# Patient Record
Sex: FEMALE | Race: OTHER | HISPANIC OR LATINO | Employment: FULL TIME | URBAN - METROPOLITAN AREA
[De-identification: names, ages, dates, MRNs, and addresses within clinical notes are randomized per-mention and may not be internally consistent; named-entity substitution may affect disease eponyms.]

---

## 2019-10-31 ENCOUNTER — APPOINTMENT (OUTPATIENT)
Dept: RADIOLOGY | Age: 49
End: 2019-10-31
Payer: COMMERCIAL

## 2019-10-31 ENCOUNTER — OFFICE VISIT (OUTPATIENT)
Dept: URGENT CARE | Age: 49
End: 2019-10-31
Payer: COMMERCIAL

## 2019-10-31 VITALS
SYSTOLIC BLOOD PRESSURE: 122 MMHG | OXYGEN SATURATION: 100 % | HEART RATE: 69 BPM | TEMPERATURE: 97.9 F | DIASTOLIC BLOOD PRESSURE: 62 MMHG | WEIGHT: 144 LBS | BODY MASS INDEX: 24.59 KG/M2 | RESPIRATION RATE: 18 BRPM | HEIGHT: 64 IN

## 2019-10-31 DIAGNOSIS — M25.571 ACUTE RIGHT ANKLE PAIN: ICD-10-CM

## 2019-10-31 DIAGNOSIS — S93.491A SPRAIN OF ANTERIOR TALOFIBULAR LIGAMENT OF RIGHT ANKLE, INITIAL ENCOUNTER: Primary | ICD-10-CM

## 2019-10-31 PROCEDURE — 99203 OFFICE O/P NEW LOW 30 MIN: CPT | Performed by: NURSE PRACTITIONER

## 2019-10-31 PROCEDURE — 73610 X-RAY EXAM OF ANKLE: CPT

## 2019-10-31 RX ORDER — LEVOTHYROXINE SODIUM 0.12 MG/1
TABLET ORAL
COMMUNITY
Start: 2019-10-10

## 2019-10-31 RX ORDER — NAPROXEN 500 MG/1
500 TABLET ORAL 2 TIMES DAILY WITH MEALS
Qty: 30 TABLET | Refills: 0 | Status: SHIPPED | OUTPATIENT
Start: 2019-10-31

## 2019-10-31 NOTE — LETTER
October 31, 2019     Patient: Astrid Guerrero   YOB: 1970   Date of Visit: 10/31/2019       To Whom it May Concern:    Astrid Guerrero was seen in my clinic on 10/31/2019  She may return to work on 11/1/2019  If you have any questions or concerns, please don't hesitate to call           Sincerely,          SANDRA Espino        CC: No Recipients

## 2019-10-31 NOTE — PROGRESS NOTES
[facility]        NAME: Minor Redd is a 54 y o  female  : 1970    MRN: [id number]  DATE: 2019  TIME: 12:32 PM    Assessment and Plan   Acute right ankle pain [M25 571]  1  Acute right ankle pain  XR ankle 3+ vw right     Check xray of ankle   Images reviewed by myself - no evidence of fracture on initial read  Suspect ankle sprain   Ace wrap applied   Ok for work tomorrow   Start naproxyn   F/u with pcp if no improvement by mid week next week    Patient Instructions     Follow up with PCP in 3-5 days  Proceed to  ER if symptoms worsen  Chief Complaint     Chief Complaint   Patient presents with    Foot Pain     Pt woke up today with posterior right ankle pain, worse with pressure  Denies injury  History of Present Illness   Minor Redd presents to the clinic c/o    Pt states works as a  at [employer]  Walks around a lot  Wears sneakers  Last night after work noticed some pain - put an icy hot patch on and a brace  Woke up at 430am to use the bathroom  Pain increased from what it was when she went to bed, however, when she woke up this morning pain was worse  Here for evaluation  Review of Systems   Review of Systems   All other systems reviewed and are negative  Current Medications     Long-Term Medications   Medication Sig Dispense Refill    levothyroxine 125 mcg tablet          Current Allergies     Allergies as of 10/31/2019    (No Known Allergies)            The following portions of the patient's history were reviewed and updated as appropriate: allergies, current medications, past family history, past medical history, past social history, past surgical history and problem list     Objective   /62   Pulse 69   Temp 97 9 °F (36 6 °C)   Resp 18   Ht 5' 4" (1 626 m)   Wt 65 3 kg (144 lb)   SpO2 100%   BMI 24 72 kg/m²        Physical Exam     Physical Exam   Constitutional: She is oriented to person, place, and time  Vital signs are normal  She appears well-developed and well-nourished  She is active and cooperative  HENT:   Head: Normocephalic and atraumatic  Eyes: Conjunctivae and lids are normal    Cardiovascular: Normal rate, regular rhythm, S1 normal, S2 normal and normal heart sounds  Pulmonary/Chest: Effort normal and breath sounds normal    Musculoskeletal:        Right ankle: She exhibits normal range of motion, no swelling, no ecchymosis, no deformity, no laceration and normal pulse  Tenderness  AITFL tenderness found  No lateral malleolus, no medial malleolus, no CF ligament, no posterior TFL, no head of 5th metatarsal and no proximal fibula tenderness found  Achilles tendon normal         Feet:    Neurological: She is alert and oriented to person, place, and time  Skin: Skin is warm and dry  Psychiatric: She has a normal mood and affect  Her speech is normal and behavior is normal  Judgment and thought content normal  Cognition and memory are normal    Nursing note and vitals reviewed

## 2019-10-31 NOTE — PATIENT INSTRUCTIONS

## 2022-10-02 ENCOUNTER — OFFICE VISIT (OUTPATIENT)
Dept: URGENT CARE | Age: 52
End: 2022-10-02
Payer: COMMERCIAL

## 2022-10-02 ENCOUNTER — APPOINTMENT (OUTPATIENT)
Dept: RADIOLOGY | Age: 52
End: 2022-10-02
Payer: COMMERCIAL

## 2022-10-02 VITALS
RESPIRATION RATE: 18 BRPM | BODY MASS INDEX: 26.43 KG/M2 | WEIGHT: 154 LBS | OXYGEN SATURATION: 99 % | DIASTOLIC BLOOD PRESSURE: 94 MMHG | TEMPERATURE: 98.5 F | HEART RATE: 75 BPM | SYSTOLIC BLOOD PRESSURE: 158 MMHG

## 2022-10-02 DIAGNOSIS — M54.2 NECK PAIN: Primary | ICD-10-CM

## 2022-10-02 DIAGNOSIS — R20.0 NUMBNESS AND TINGLING IN LEFT ARM: ICD-10-CM

## 2022-10-02 DIAGNOSIS — R20.2 NUMBNESS AND TINGLING IN LEFT ARM: ICD-10-CM

## 2022-10-02 DIAGNOSIS — R20.0 NUMBNESS OF ARM: ICD-10-CM

## 2022-10-02 PROCEDURE — 93005 ELECTROCARDIOGRAM TRACING: CPT | Performed by: PHYSICIAN ASSISTANT

## 2022-10-02 PROCEDURE — 99214 OFFICE O/P EST MOD 30 MIN: CPT | Performed by: PHYSICIAN ASSISTANT

## 2022-10-02 PROCEDURE — 72050 X-RAY EXAM NECK SPINE 4/5VWS: CPT

## 2022-10-02 RX ORDER — GABAPENTIN 100 MG/1
100 CAPSULE ORAL 3 TIMES DAILY
Qty: 30 CAPSULE | Refills: 0 | Status: SHIPPED | OUTPATIENT
Start: 2022-10-02

## 2022-10-02 RX ORDER — CYCLOBENZAPRINE HCL 10 MG
10 TABLET ORAL 3 TIMES DAILY PRN
Qty: 15 TABLET | Refills: 0 | Status: SHIPPED | OUTPATIENT
Start: 2022-10-02

## 2022-10-02 RX ORDER — PREDNISONE 10 MG/1
40 TABLET ORAL DAILY
Qty: 20 TABLET | Refills: 0 | Status: SHIPPED | OUTPATIENT
Start: 2022-10-02 | End: 2022-10-07

## 2022-10-02 NOTE — PATIENT INSTRUCTIONS
Meds as prescribed  Take gabapentin as prescribed  Flexeril as needed as prescribed  If symptoms do not improve in the next 2-3 days on medications follow-up with primary care doctor report to the emergency room    If symptoms worsen reports emergency room immediately

## 2022-10-02 NOTE — PROGRESS NOTES
[facility]        NAME: Gaurav Colón is a 50 y o  female  : 1970    MRN: [id number]  DATE: 2022  TIME: 4:51 PM    Assessment and Plan   Neck pain [M54 2]  1  Neck pain  ECG with rhythm strip    XR spine cervical complete 4 or 5 vw non injury    predniSONE 10 mg tablet    cyclobenzaprine (FLEXERIL) 10 mg tablet   2  Numbness of arm  predniSONE 10 mg tablet    gabapentin (Neurontin) 100 mg capsule   Patient presents with left-sided neck pain, numbness and tingling in the left upper extremity and pain to the left breast for 1 week duration  Patient reports of breath symptoms have been improving since she has had a primary care doctor and obtained clotrimazole to treat her rash  Recommend ECG do patient's family history and current symptoms  ECG demonstrates normal sinus rhythm with no acute ST elevation and no interval changes  Symptoms and examination are consistent with cervical radiculopathy with complaint of numbness and tingling the left upper extremity and left neck pain along with hyperreflexia of the triceps and biceps and weakness with  strength  Cardiac cause unlikely at this time  Recommend x-ray  x-ray for further evaluation  X-rays demonstrate preserved disc spaces with no acute fractures dislocations  There is no facet arthropathy noted  Recommend prednisone burst, Flexeril and gabapentin to treat her symptoms  Patient is instructed to follow-up with her PCP in 3-5 days if symptoms are not improved  She will report to the emergency department if symptoms worsen or she develops any chest pain or shortness of breath  Patient Instructions   Patient Instructions   Meds as prescribed  Take gabapentin as prescribed  Flexeril as needed as prescribed  If symptoms do not improve in the next 2-3 days on medications follow-up with primary care doctor report to the emergency room    If symptoms worsen reports emergency room immediately      Follow up with PCP in 3-5 days  Proceed to  ER if symptoms worsen  Chief Complaint     Chief Complaint   Patient presents with    Neck Pain     Left-sided neck pain, left breast pain, episodes of left arm numbness at night x 1 week         History of Present Illness       50year old female presents with complaint of left sided neck pain with LUE numbness and tingling x 1 week  She also notes pain below her left breast with an associated rash  Pt did see her PCP for her symptoms on Thursday and was given a cream for the rash and naproxen for her other symptoms  Rash has improved, but Naproxen is not helping her neck or left arm  Pt denies history of neck pain and neck injury  She denies weakness of the LUE  Pt denies pain, weakness, and paresthesias of BLE and RUE  She denies saddle anesthesia and changes in bowel and bladder function  She does feel a little off balance when walking  She has a strong family history of CAD and stroke and reports personal history of palpitations  No other concerns or complaints today  Review of Systems   Review of Systems   Constitutional: Positive for fatigue  Negative for chills and fever  Musculoskeletal: Positive for neck pain  Neurological: Positive for numbness  Negative for weakness           Current Medications       Current Outpatient Medications:     cyclobenzaprine (FLEXERIL) 10 mg tablet, Take 1 tablet (10 mg total) by mouth 3 (three) times a day as needed for muscle spasms, Disp: 15 tablet, Rfl: 0    gabapentin (Neurontin) 100 mg capsule, Take 1 capsule (100 mg total) by mouth 3 (three) times a day, Disp: 30 capsule, Rfl: 0    levothyroxine 125 mcg tablet, , Disp: , Rfl:     predniSONE 10 mg tablet, Take 4 tablets (40 mg total) by mouth daily for 5 days, Disp: 20 tablet, Rfl: 0    naproxen (NAPROSYN) 500 mg tablet, Take 1 tablet (500 mg total) by mouth 2 (two) times a day with meals (Patient not taking: Reported on 10/2/2022), Disp: 30 tablet, Rfl: 0    Current Allergies Allergies as of 10/02/2022    (No Known Allergies)            The following portions of the patient's history were reviewed and updated as appropriate: allergies, current medications, past family history, past medical history, past social history, past surgical history and problem list      Past Medical History:   Diagnosis Date    Hypothyroidism        Past Surgical History:   Procedure Laterality Date    TUBAL LIGATION         History reviewed  No pertinent family history  Medications have been verified  Objective   /94   Pulse 75   Temp 98 5 °F (36 9 °C)   Resp 18   Wt 69 9 kg (154 lb)   SpO2 99%   BMI 26 43 kg/m²   No LMP recorded  Physical Exam     Physical Exam  Vitals and nursing note reviewed  Constitutional:       General: She is not in acute distress  Appearance: She is well-developed  She is not ill-appearing  HENT:      Head: Normocephalic and atraumatic  Right Ear: External ear normal       Left Ear: External ear normal       Nose: No nasal deformity  Mouth/Throat:      Lips: Pink  No lesions  Eyes:      General: Lids are normal  Gaze aligned appropriately  Extraocular Movements: Extraocular movements intact  Neck:      Thyroid: No thyroid mass, thyromegaly or thyroid tenderness  Vascular: No carotid bruit  Trachea: Trachea normal       Comments: No tenderness to palpation of the cervical spinous processes, no spasm noted  Cardiovascular:      Rate and Rhythm: Normal rate and regular rhythm  Pulses:           Radial pulses are 2+ on the right side and 2+ on the left side  Posterior tibial pulses are 2+ on the right side and 2+ on the left side  Heart sounds: Normal heart sounds, S1 normal and S2 normal  Heart sounds not distant  No murmur heard  No friction rub  No gallop  Pulmonary:      Effort: Pulmonary effort is normal       Breath sounds: Normal breath sounds   No decreased breath sounds, wheezing, rhonchi or rales  Musculoskeletal:      Cervical back: Neck supple  No torticollis  Pain with movement and muscular tenderness (left SCM) present  No spinous process tenderness  Decreased range of motion (lateral flexion and rotation limited secondary to pain  )  Comments: Pt has 5/5 strength BUE and BLE   Lymphadenopathy:      Cervical: No cervical adenopathy  Neurological:      Mental Status: She is alert and oriented to person, place, and time  Sensory: Sensation is intact  Motor: Weakness (Decreased  strength on the left side) present  Gait: Gait is intact  Deep Tendon Reflexes:      Reflex Scores:       Tricep reflexes are 2+ on the right side and 3+ on the left side  Bicep reflexes are 2+ on the right side and 3+ on the left side  Brachioradialis reflexes are 2+ on the right side and 2+ on the left side  Patellar reflexes are 2+ on the right side and 2+ on the left side  Achilles reflexes are 2+ on the right side and 2+ on the left side  Psychiatric:         Attention and Perception: Attention and perception normal          Mood and Affect: Mood and affect normal          Speech: Speech normal          Behavior: Behavior normal  Behavior is cooperative  Note: Portions of this record may have been created with voice recognition software  Occasional wrong word or "sound a like" substitutions may have occurred due to the inherent limitations of voice recognition software  Please read the chart carefully and recognize, using context, where substitutions have occurred  *

## 2022-10-02 NOTE — LETTER
October 2, 2022     Patient: Natividad Gillespie   YOB: 1970   Date of Visit: 10/2/2022       To Whom It May Concern: It is my medical opinion that Natividad Gillespie may return to work on 10/04/2022       If you have any questions or concerns, please don't hesitate to call           Sincerely,        Gemma Mendez PA-C    CC: No Recipients Unknown

## 2022-10-19 LAB
ATRIAL RATE: 63 BPM
P AXIS: 42 DEGREES
PR INTERVAL: 160 MS
QRS AXIS: 44 DEGREES
QRSD INTERVAL: 88 MS
QT INTERVAL: 400 MS
QTC INTERVAL: 409 MS
T WAVE AXIS: -7 DEGREES
VENTRICULAR RATE: 63 BPM

## 2022-10-19 PROCEDURE — 93010 ELECTROCARDIOGRAM REPORT: CPT | Performed by: STUDENT IN AN ORGANIZED HEALTH CARE EDUCATION/TRAINING PROGRAM
